# Patient Record
Sex: FEMALE | Race: BLACK OR AFRICAN AMERICAN | NOT HISPANIC OR LATINO | Employment: FULL TIME | URBAN - METROPOLITAN AREA
[De-identification: names, ages, dates, MRNs, and addresses within clinical notes are randomized per-mention and may not be internally consistent; named-entity substitution may affect disease eponyms.]

---

## 2021-07-18 PROCEDURE — 96374 THER/PROPH/DIAG INJ IV PUSH: CPT | Mod: 59

## 2021-07-18 PROCEDURE — 99285 EMERGENCY DEPT VISIT HI MDM: CPT | Mod: 25

## 2021-07-18 ASSESSMENT — MIFFLIN-ST. JEOR: SCORE: 1502.87

## 2021-07-19 ENCOUNTER — HOSPITAL ENCOUNTER (EMERGENCY)
Dept: CT IMAGING | Facility: HOSPITAL | Age: 31
End: 2021-07-19
Attending: EMERGENCY MEDICINE
Payer: COMMERCIAL

## 2021-07-19 ENCOUNTER — HOSPITAL ENCOUNTER (EMERGENCY)
Facility: HOSPITAL | Age: 31
Discharge: HOME | End: 2021-07-19
Attending: EMERGENCY MEDICINE | Admitting: EMERGENCY MEDICINE
Payer: COMMERCIAL

## 2021-07-19 VITALS
BODY MASS INDEX: 37.5 KG/M2 | WEIGHT: 191 LBS | OXYGEN SATURATION: 99 % | RESPIRATION RATE: 18 BRPM | DIASTOLIC BLOOD PRESSURE: 64 MMHG | TEMPERATURE: 98.5 F | HEIGHT: 60 IN | SYSTOLIC BLOOD PRESSURE: 110 MMHG | HEART RATE: 84 BPM

## 2021-07-19 DIAGNOSIS — N83.209 CYST OF OVARY, UNSPECIFIED LATERALITY: ICD-10-CM

## 2021-07-19 LAB
ALBUMIN UR-MCNC: NEGATIVE MG/DL
ANION GAP SERPL CALCULATED.3IONS-SCNC: 10 MMOL/L (ref 5–18)
APPEARANCE UR: CLEAR
BASOPHILS # BLD AUTO: 0 10E3/UL (ref 0–0.2)
BASOPHILS NFR BLD AUTO: 0 %
BILIRUB UR QL STRIP: NEGATIVE
BUN SERPL-MCNC: 11 MG/DL (ref 8–22)
CALCIUM SERPL-MCNC: 8.8 MG/DL (ref 8.5–10.5)
CHLORIDE BLD-SCNC: 109 MMOL/L (ref 98–107)
CO2 SERPL-SCNC: 21 MMOL/L (ref 22–31)
COLOR UR AUTO: ABNORMAL
CREAT SERPL-MCNC: 0.73 MG/DL (ref 0.6–1.1)
EOSINOPHIL # BLD AUTO: 0.2 10E3/UL (ref 0–0.7)
EOSINOPHIL NFR BLD AUTO: 2 %
ERYTHROCYTE [DISTWIDTH] IN BLOOD BY AUTOMATED COUNT: 16.2 % (ref 10–15)
GFR SERPL CREATININE-BSD FRML MDRD: >90 ML/MIN/1.73M2
GLUCOSE BLD-MCNC: 93 MG/DL (ref 70–125)
GLUCOSE UR STRIP-MCNC: NEGATIVE MG/DL
HCG UR QL: NEGATIVE
HCT VFR BLD AUTO: 34.6 % (ref 35–47)
HGB BLD-MCNC: 10.8 G/DL (ref 11.7–15.7)
HGB UR QL STRIP: NEGATIVE
HOLD SPECIMEN: NORMAL
HOLD SPECIMEN: NORMAL
IMM GRANULOCYTES # BLD: 0 10E3/UL
IMM GRANULOCYTES NFR BLD: 0 %
INTERNAL QC OK POCT: NORMAL
KETONES UR STRIP-MCNC: NEGATIVE MG/DL
LEUKOCYTE ESTERASE UR QL STRIP: NEGATIVE
LYMPHOCYTES # BLD AUTO: 2.9 10E3/UL (ref 0.8–5.3)
LYMPHOCYTES NFR BLD AUTO: 36 %
MCH RBC QN AUTO: 24.8 PG (ref 26.5–33)
MCHC RBC AUTO-ENTMCNC: 31.2 G/DL (ref 31.5–36.5)
MCV RBC AUTO: 79 FL (ref 78–100)
MONOCYTES # BLD AUTO: 0.6 10E3/UL (ref 0–1.3)
MONOCYTES NFR BLD AUTO: 8 %
MUCOUS THREADS #/AREA URNS LPF: PRESENT /LPF
NEUTROPHILS # BLD AUTO: 4.4 10E3/UL (ref 1.6–8.3)
NEUTROPHILS NFR BLD AUTO: 54 %
NITRATE UR QL: NEGATIVE
NRBC # BLD AUTO: 0 10E3/UL
NRBC BLD AUTO-RTO: 0 /100
PH UR STRIP: 6.5 [PH] (ref 5–7)
PLATELET # BLD AUTO: 301 10E3/UL (ref 150–450)
POTASSIUM BLD-SCNC: 4.3 MMOL/L (ref 3.5–5)
RBC # BLD AUTO: 4.36 10E6/UL (ref 3.8–5.2)
RBC URINE: 0 /HPF
SODIUM SERPL-SCNC: 140 MMOL/L (ref 136–145)
SP GR UR STRIP: 1.03 (ref 1–1.03)
SQUAMOUS EPITHELIAL: 2 /HPF
UROBILINOGEN UR STRIP-MCNC: <2 MG/DL
WBC # BLD AUTO: 8 10E3/UL (ref 4–11)
WBC URINE: <1 /HPF

## 2021-07-19 PROCEDURE — 250N000011 HC RX IP 250 OP 636: Performed by: EMERGENCY MEDICINE

## 2021-07-19 PROCEDURE — 81001 URINALYSIS AUTO W/SCOPE: CPT | Performed by: EMERGENCY MEDICINE

## 2021-07-19 PROCEDURE — 36415 COLL VENOUS BLD VENIPUNCTURE: CPT | Performed by: EMERGENCY MEDICINE

## 2021-07-19 PROCEDURE — 85004 AUTOMATED DIFF WBC COUNT: CPT | Performed by: EMERGENCY MEDICINE

## 2021-07-19 PROCEDURE — 81001 URINALYSIS AUTO W/SCOPE: CPT | Performed by: STUDENT IN AN ORGANIZED HEALTH CARE EDUCATION/TRAINING PROGRAM

## 2021-07-19 PROCEDURE — 74177 CT ABD & PELVIS W/CONTRAST: CPT

## 2021-07-19 PROCEDURE — 36592 COLLECT BLOOD FROM PICC: CPT | Performed by: EMERGENCY MEDICINE

## 2021-07-19 PROCEDURE — 82374 ASSAY BLOOD CARBON DIOXIDE: CPT | Performed by: EMERGENCY MEDICINE

## 2021-07-19 RX ORDER — IOPAMIDOL 755 MG/ML
100 INJECTION, SOLUTION INTRAVASCULAR ONCE
Status: COMPLETED | OUTPATIENT
Start: 2021-07-19 | End: 2021-07-19

## 2021-07-19 RX ORDER — KETOROLAC TROMETHAMINE 15 MG/ML
15 INJECTION, SOLUTION INTRAMUSCULAR; INTRAVENOUS ONCE
Status: COMPLETED | OUTPATIENT
Start: 2021-07-19 | End: 2021-07-19

## 2021-07-19 RX ADMIN — IOPAMIDOL 100 ML: 755 INJECTION, SOLUTION INTRAVENOUS at 02:27

## 2021-07-19 RX ADMIN — KETOROLAC TROMETHAMINE 15 MG: 15 INJECTION, SOLUTION INTRAMUSCULAR; INTRAVENOUS at 01:23

## 2021-07-19 ASSESSMENT — ENCOUNTER SYMPTOMS
DYSURIA: 0
ABDOMINAL PAIN: 1
VOMITING: 0
HEMATURIA: 0
NAUSEA: 0

## 2021-07-19 NOTE — ED PROVIDER NOTES
EMERGENCY DEPARTMENT ENCOUNTER      NAME: Danii Ordoñez  AGE: 31 year old female  YOB: 1990  MRN: [id number]  EVALUATION DATE & TIME: 2021 12:54 AM    PCP: No primary care provider on file.    ED PROVIDER: Mai Day M.D.      Chief Complaint   Patient presents with     Abdominal Pain     Pelvic Pain         FINAL IMPRESSION:  1. Cyst of ovary, unspecified laterality        MEDICAL DECISION MAKIN:10 AM I met with the patient, obtained history, performed an initial exam, and discussed options and plan for diagnostics and treatment here in the ED.   Pertinent Labs & Imaging studies reviewed. (See chart for details)  3:12 AM I rechecked and updated the patient.  Patient feels great and is ready to go.          Danii Ordoñez is a 31 year old female who presents with abdominal pain.  The pain is in the center of the abdomen just above the pelvic brim.  Her vital signs are normal.  She has not had any fever or infectious signs.  No nausea or vomiting.  She is not tender over the appendix.  She has never had abdominal surgeries.  She is not pregnant.  I will get basic labs but I suspect either functional abdominal pain, ovarian cyst or constipation.  I will get a CT scan to evaluate for early appendicitis.  She will get medications for pain.    Labs show an anemia which is chronic given her sickle cell trait.  All of her other labs are normal.  Her CT scan showed an ovarian cyst without evidence for torsion or hemorrhage.  This is likely the cause of her pain given the location.  No evidence for intra-abdominal bacterial infection.  On reassessment, her abdominal exam remains benign.  She is comfortable after the pain medications.    At the conclusion of the encounter, we discussed warning signs and indications to return to the emergency department.  She understands these warning signs and all discharge instructions.         MEDICATIONS GIVEN IN THE EMERGENCY:  Medications   ketorolac  (TORADOL) injection 15 mg (15 mg Intravenous Given 7/19/21 0123)         =================================================================    HPI    Patient information was obtained from: Patient    Use of : N/A         Danii Ordoñez is a 31 year old female with no pertinent history who presents to this ED by car for evaluation of abdominal pain.     Patient reports that having lower abdominal pain since 7/15. Patients last period was normal and she is not on birth control. She mentions he has had cramping. She has had good bowel movements and has had no surgeries or recent straining. She took Advil but this did not help. Patient is not breast feeding. She is not vaccinated for covid. Patient denies any vaginal discharge, vaginal bleeding, nausea, vomiting, dysuria, hematuria, or any other complaints at this time.       REVIEW OF SYSTEMS   Review of Systems   Gastrointestinal: Positive for abdominal pain. Negative for nausea and vomiting.   Genitourinary: Negative for dysuria, hematuria, vaginal bleeding and vaginal discharge.   All other systems reviewed and are negative.       PAST MEDICAL HISTORY:  Past Medical History:   Diagnosis Date     Anxiety      Depression      Lactose intolerance      Sickle cell trait (H)        PAST SURGICAL HISTORY:  History reviewed. No pertinent surgical history.    CURRENT MEDICATIONS:    No current facility-administered medications for this encounter.  No current outpatient medications on file.    ALLERGIES:  No Known Allergies    FAMILY HISTORY:  History reviewed. No pertinent family history.    SOCIAL HISTORY:   Social History     Tobacco Use     Smoking status: Never Smoker   Substance Use Topics     Alcohol use: No     Drug use: Not on file        PHYSICAL EXAM:    Vitals: /57   Pulse 81   Temp 98.5  F (36.9  C) (Temporal)   Resp 18   Ht 1.524 m (5')   Wt 86.6 kg (191 lb)   SpO2 100%   BMI 37.30 kg/m     General:. Alert and interactive, comfortable  appearing.  HENT: Oropharynx without erythema or exudates. MMM.  TMs clear bilaterally.  Eyes: Pupils mid-sized and equally reactive.   Neck: Full AROM.  No midline tenderness to palpation.  Cardiovascular: Regular rate and rhythm. Peripheral pulses 2+ bilaterally.  Chest/Pulmonary: Normal work of breathing. Lung sounds clear and equal throughout, no wheezes or crackles. No chest wall tenderness or deformities.  Abdomen: Soft, nondistended. Without guarding or rebound. Midline super pubic tenderness. No RUQ tenderness, no flank tenderness.   Back/Spine: No CVA or midline tenderness.  Extremities: Normal ROM of all major joints. No lower extremity edema.   Skin: Warm and dry. Normal skin color.   Neuro: Speech clear. CNs grossly intact. Moves all extremities appropriately. Strength and sensation grossly intact to all extremities.   Psych: Normal affect/mood, cooperative, memory appropriate.     LAB:  All pertinent labs reviewed and interpreted.  Labs Ordered and Resulted from Time of ED Arrival Up to the Time of Departure from the ED   BASIC METABOLIC PANEL - Abnormal; Notable for the following components:       Result Value    Chloride 109 (*)     Carbon Dioxide (CO2) 21 (*)     All other components within normal limits   ROUTINE UA WITH MICROSCOPIC REFLEX TO CULTURE - Abnormal; Notable for the following components:    Mucus Urine Present (*)     Squamous Epithelials Urine 2 (*)     All other components within normal limits    Narrative:     Urine Culture not indicated   CBC WITH PLATELETS AND DIFFERENTIAL - Abnormal; Notable for the following components:    Hemoglobin 10.8 (*)     Hematocrit 34.6 (*)     MCH 24.8 (*)     MCHC 31.2 (*)     RDW 16.2 (*)     All other components within normal limits   HCG QUALITATIVE URINE POCT - Normal   EXTRA RED TOP TUBE   EXTRA GREEN TOP (LITHIUM HEPARIN) TUBE   ISTAT HCG QUANTITATIVE PREGNANCY NURSING POCT   CBC WITH PLATELETS & DIFFERENTIAL    Narrative:     The following orders  were created for panel order CBC with Platelets & Differential.  Procedure                               Abnormality         Status                     ---------                               -----------         ------                     CBC with platelets and d...[[id number]]  Abnormal            Final result                 Please view results for these tests on the individual orders.   EXTRA TUBE    Narrative:     The following orders were created for panel order [place] Draw.  Procedure                               Abnormality         Status                     ---------                               -----------         ------                     Extra Red Top Tube[[id number]]                               Final result                 Please view results for these tests on the individual orders.   EXTRA TUBE    Narrative:     The following orders were created for panel order [place] Draw.  Procedure                               Abnormality         Status                     ---------                               -----------         ------                     Extra Green Top (Lithium...[[id number]]                      Final result                 Please view results for these tests on the individual orders.       RADIOLOGY:  Abd/pelvis CT,  IV  contrast only TRAUMA / AAA   Preliminary Result   IMPRESSION:    1. 4 cm right adnexal cyst. This is nonspecific, but most likely a functional ovarian cyst.   2. A trace amount of free fluid in the right hemipelvis is nonspecific, but could relate to a ruptured ovarian cyst.   3. No other acute abnormality identified in the abdomen or pelvis. Normal appendix.             I, Nathen De Jesus, am serving as a scribe to document services personally performed by Dr. Mai Day  based on my observation and the provider's statements to me. I, Mai Day MD attest that Nathen De Jesus is acting in a scribe capacity, has observed my performance of the services and has  documented them in accordance with my direction.      Mai Day M.D.  Emergency Medicine  [facility] EMERGENCY DEPARTMENT  [facility] [facility] [id number] [phone]  Dept: [phone]         Mai Day MD  07/19/21 0886

## 2021-07-19 NOTE — DISCHARGE INSTRUCTIONS
You have an ovarian cyst that is likely the cause of your pain.  There is nothing abnormal in the appearance of your cyst on the CT scan.  Continue to use ibuprofen 600 mg every 6 hours and alternate with Tylenol 1000 mg every 6 hours for pain.  With a small amount of food to avoid stomach upset.    Apply warm compresses to the lower belly.  Symptoms of an ovarian cyst typically get better within a few days.    If you are not getting better, please follow-up with your OB/GYN for additional testing.

## 2021-07-19 NOTE — ED TRIAGE NOTES
Pt has had low abdominal/pelvic pain for about 4 days. Last period end of last month. No bleeding at this time. Pt describes pain as sharp and stabbing, Advil has not helped with the pain.

## 2021-07-19 NOTE — ED NOTES
Patient was sleeping just prior to discharge. Reviewed discharge instructions. Patient is taking an uber home.